# Patient Record
Sex: FEMALE
[De-identification: names, ages, dates, MRNs, and addresses within clinical notes are randomized per-mention and may not be internally consistent; named-entity substitution may affect disease eponyms.]

---

## 2024-06-01 ENCOUNTER — RX ONLY (RX ONLY)
Age: 34
End: 2024-06-01

## 2024-07-24 ENCOUNTER — APPOINTMENT (RX ONLY)
Dept: URBAN - METROPOLITAN AREA CLINIC 88 | Facility: CLINIC | Age: 34
Setting detail: DERMATOLOGY
End: 2024-07-24

## 2024-12-16 ENCOUNTER — RX ONLY (RX ONLY)
Age: 34
End: 2024-12-16

## 2024-12-16 ENCOUNTER — APPOINTMENT (OUTPATIENT)
Dept: URBAN - METROPOLITAN AREA CLINIC 88 | Facility: CLINIC | Age: 34
Setting detail: DERMATOLOGY
End: 2024-12-16

## 2024-12-16 DIAGNOSIS — Z01.818 ENCOUNTER FOR OTHER PREPROCEDURAL EXAMINATION: ICD-10-CM

## 2024-12-16 PROCEDURE — ? ADDITIONAL NOTES

## 2024-12-16 RX ORDER — ONDANSETRON 4 MG/1
TABLET, ORALLY DISINTEGRATING ORAL
Qty: 15 | Refills: 0 | Status: ERX | COMMUNITY
Start: 2024-12-16

## 2024-12-16 RX ORDER — CYCLOBENZAPRINE HYDROCHLORIDE 5 MG/1
TABLET, FILM COATED ORAL
Qty: 30 | Refills: 0 | Status: ERX | COMMUNITY
Start: 2024-12-16

## 2024-12-16 RX ORDER — OXYCODONE HYDROCHLORIDE AND ACETAMINOPHEN 5; 325 MG/1; MG/1
TABLET ORAL
Qty: 28 | Refills: 0 | Status: ERX | COMMUNITY
Start: 2024-12-16

## 2024-12-16 RX ORDER — APREPITANT 40 MG/1
CAPSULE ORAL
Qty: 1 | Refills: 0 | Status: ERX | COMMUNITY
Start: 2024-12-16

## 2024-12-16 RX ORDER — CEPHALEXIN 500 MG/1
CAPSULE ORAL
Qty: 21 | Refills: 2 | Status: ERX | COMMUNITY
Start: 2024-12-16

## 2024-12-16 NOTE — PROCEDURE: ADDITIONAL NOTES
Additional Notes: SX Date: 02/03/24 / SX - Explant only. Sister explant with Dr Jesus Lake. - Lives in [place]. Fl\\nPre-op 12/16/24\\n\\nHT: 5'3 / WT: 135 lbs\\nAllergies: NKA\\nPMH: Depressive disorder and anxiety. Denies cv hx, no DVT, no bleeding hx.\\nFamily HX Malignant Hyperthermia: Denies.\\nHospitalizations: Denies.\\nMeds: Fluoxetine 20mg, amoxicillin last wednesday- bitten by a Pitbull left index finger- . Probiotics.\\nHRT:Denies\\nSX HX: Breast aug 2012, mentor silicone (lately feels top right ripple) - rhinoplasty,\\nHx of Nausea after sx: No\\nSmoke/Vape/Hookah: Denies. Not around smokers.\\nNicotine test today: No.\\nETOH: Yes.- Social.\\nImplants: No\\nPath: Surgeon's discretion\\n\\nExparel: Yes.\\nMyers: Yes.\\nBB: Yes\\n\\nMother will be taking care of her.\\nMeds sent to pharmacy x 4 and Percocet PRN pain \"acute pain exception\".\\nFollow up visits scheduled, patient notified.\\nLabwork CBC w Diff, CMP / PT/ PTT/ INR/ EKG- Requested / Photos on file.\\nStop blood thinners 2 weeks prior.\\nNicotine test day of sx, Pregnancy test.\\nPt verbalized understanding and agreement.\\nPt acknowledged receiving email with instructions.
Render Risk Assessment In Note?: no

## 2025-02-04 ENCOUNTER — APPOINTMENT (OUTPATIENT)
Dept: URBAN - METROPOLITAN AREA CLINIC 88 | Facility: CLINIC | Age: 35
Setting detail: DERMATOLOGY
End: 2025-02-04

## 2025-02-11 ENCOUNTER — APPOINTMENT (OUTPATIENT)
Dept: URBAN - METROPOLITAN AREA CLINIC 88 | Facility: CLINIC | Age: 35
Setting detail: DERMATOLOGY
End: 2025-02-11

## 2025-02-25 ENCOUNTER — APPOINTMENT (OUTPATIENT)
Dept: URBAN - METROPOLITAN AREA CLINIC 88 | Facility: CLINIC | Age: 35
Setting detail: DERMATOLOGY
End: 2025-02-25

## 2025-03-13 ENCOUNTER — APPOINTMENT (OUTPATIENT)
Dept: URBAN - METROPOLITAN AREA CLINIC 88 | Facility: CLINIC | Age: 35
Setting detail: DERMATOLOGY
End: 2025-03-13

## 2025-05-16 ENCOUNTER — APPOINTMENT (OUTPATIENT)
Dept: URBAN - METROPOLITAN AREA CLINIC 88 | Facility: CLINIC | Age: 35
Setting detail: DERMATOLOGY
End: 2025-05-16